# Patient Record
Sex: MALE | Race: WHITE | Employment: UNEMPLOYED | URBAN - METROPOLITAN AREA
[De-identification: names, ages, dates, MRNs, and addresses within clinical notes are randomized per-mention and may not be internally consistent; named-entity substitution may affect disease eponyms.]

---

## 2020-04-01 PROCEDURE — 99282 EMERGENCY DEPT VISIT SF MDM: CPT

## 2020-04-02 ENCOUNTER — HOSPITAL ENCOUNTER (EMERGENCY)
Facility: CLINIC | Age: 45
Discharge: HOME | End: 2020-04-02
Attending: EMERGENCY MEDICINE | Admitting: EMERGENCY MEDICINE

## 2020-04-02 VITALS
BODY MASS INDEX: 41.62 KG/M2 | RESPIRATION RATE: 20 BRPM | TEMPERATURE: 95.6 F | OXYGEN SATURATION: 100 % | WEIGHT: 315 LBS | DIASTOLIC BLOOD PRESSURE: 89 MMHG | SYSTOLIC BLOOD PRESSURE: 147 MMHG | HEART RATE: 111 BPM

## 2020-04-02 DIAGNOSIS — Z51.89 WOUND CHECK, ABSCESS: ICD-10-CM

## 2020-04-02 DIAGNOSIS — L02.31 LEFT BUTTOCK ABSCESS: ICD-10-CM

## 2020-04-02 RX ORDER — CEPHALEXIN 500 MG/1
500 CAPSULE ORAL 4 TIMES DAILY
Qty: 40 CAPSULE | Refills: 0 | Status: SHIPPED | OUTPATIENT
Start: 2020-04-02 | End: 2020-04-12

## 2020-04-02 RX ORDER — SULFAMETHOXAZOLE/TRIMETHOPRIM 800-160 MG
1 TABLET ORAL 2 TIMES DAILY
Qty: 20 TABLET | Refills: 0 | Status: SHIPPED | OUTPATIENT
Start: 2020-04-02 | End: 2020-04-12

## 2020-04-02 ASSESSMENT — ENCOUNTER SYMPTOMS
FEVER: 0
COLOR CHANGE: 0
WOUND: 1

## 2020-04-02 NOTE — ED PROVIDER NOTES
History     Chief Complaint:    Wound Check    The history is provided by the patient.      Tony Khanna is a 44 year old male with a history of type 2 diabetes who presents for a wound check. The patient reports having an abscess on his left butt cheek and he is currently on Keflex. He has had this wound for the last 5 days. The patient has been getting drainage from the wound, going through 2-3 pads today. The patient denies seeing a redness in the area and denies any fevers. The patient's sugars have been normal lately around 160.    Allergies:  Cats  Metformin     Medications:    Keflex  Metformin   Albuterol inhaler  Viagra  Lisinopril   Lipitor    Past Medical History:    Type 2 diabetes   Obesity  Tobacco use disorder  Cervicalgia   Erectile dysfunction  Sleep apnea  Candidiasis intertrigo  Lipoma  Allergic rhinitis  Asthma    Past Surgical History:    The patient does not have any pertinent past surgical history.    Family History:    Diabetes - father  Diabetes - mother  OCD - father  Anxiety - father  Narcolepsy - father  Blood clots - father    Social History:  Positive for tobacco use.  Positive for alcohol use.   Positive for drug use - marijuana.  Marital Status:  Single [1]     Review of Systems   Constitutional: Negative for fever.   Skin: Positive for wound (left butt cheek). Negative for color change.   All other systems reviewed and are negative.      Physical Exam     Patient Vitals for the past 24 hrs:   BP Temp Temp src Pulse Resp SpO2 Weight   04/02/20 0000 (!) 149/85 95.6  F (35.3  C) Temporal 111 20 97 % (!) 151 kg (333 lb)     Physical Exam  Musculoskeletal:  Normal movement of all extremities without evidence for deficit.    Skin: Focused exam of the left buttock shows an open abscess. The superficial skin plug of necrotic tissue, approximately 2.5 cm in diameter, has dislodged, resulting in an open wound, approximately 1.5 cm deep. The underlying tissue is pink and viable. Minimal  "purulent drainage. Minimal surrounding cellulitis. No tracking into the scrotum.    Neurologic:  Non-focal exam without asymmetric weakness or numbness.     Psychiatric:  Normal affect with appropriate interaction with examiner.    Emergency Department Course     Emergency Department Course:  Past medical records, nursing notes, and vitals reviewed.    0015: I performed an exam of the patient as documented above.     I personally reviewed the results with the Patient and answered all related questions prior to discharge.     Findings and plan explained to the Patient. Patient discharged home with instructions regarding supportive care, medications, and reasons to return. The importance of close follow-up was reviewed.     Impression & Plan     Medical Decision Making:  This unfortunate 44-year-old with type 2 diabetes, only on metformin, presents to us with a recurrent abscess to his left buttock.  He notes he has had 2 prior abscesses in this exact same area.  They typically will open up, drain, and improve with antibiotics.  He first noted this abscess approximately 5 days ago.  What brought him to the ER tonight is that he noted that it opened up and started to drain today and there was a large piece of \"tissue\" hanging out of the wound.  He otherwise feels as though his pain is improved since the pressure is been released from drainage and he denies having any associated fevers.    On exam, it appears as though the necrotic skin covering this has now come loose and opened.  It is still loosely attached to what appears to be well perfusing healthy tissue beneath.  There is a scant rim of cellulitis but no extension into the perineum.  I do not see that this tracks deeper or that there are other pockets yet to open up.    All the patient notes that this is uncomfortable, he feels that he is getting around well, able to ambulate without difficulty.  He has been doing sitz baths and I advised him to continue with " this.  He has almost finished his Keflex prescription.  I will extend this and add Bactrim for broader coverage considering his weight and diabetic history.  Most importantly, I counseled him that he should return to us immediately if he develops any increasing pain, fevers, extension into the perineum/scrotum, or if he has any other emergent concerns.      Diagnosis:    ICD-10-CM   1. Wound check, abscess  Z51.89   2. Left buttock abscess  L02.31     Disposition:  Discharged to home.    Discharge Medications:  New Prescriptions    CEPHALEXIN (KEFLEX) 500 MG CAPSULE    Take 1 capsule (500 mg) by mouth 4 times daily for 10 days    SULFAMETHOXAZOLE-TRIMETHOPRIM (BACTRIM DS) 800-160 MG TABLET    Take 1 tablet by mouth 2 times daily for 10 days     Scribe Disclosure:  JIM, Rupal Villavicencio, am serving as a scribe at 12:03 AM on 4/2/2020 to document services personally performed by Trierweiler, Chad A, MD based on my observations and the provider's statements to me.     Rupal Villavicencio  4/1/2020    EMERGENCY DEPARTMENT       Trierweiler, Chad A, MD  04/02/20 0109

## 2020-04-02 NOTE — DISCHARGE INSTRUCTIONS
Change to the prescribed antibiotics.  Continue with warm soaks along with dressing changes.  It is imperative you return to the ER for any fevers, increasing pain, signs the infection is spreading to your scrotum, or if you have ANY other emergent concerns.

## 2020-04-02 NOTE — ED AVS SNAPSHOT
Emergency Department  [address] [place] [id number]  Phone:  [phone]  Fax:  [phone]                                    Tony Khanna   MRN: [id number]    Department:   Emergency Department   Date of Visit:  4/1/2020           After Visit Summary Signature Page    I have received my discharge instructions, and my questions have been answered. I have discussed any challenges I see with this plan with the nurse or doctor.    ..........................................................................................................................................  Patient/Patient Representative Signature      ..........................................................................................................................................  Patient Representative Print Name and Relationship to Patient    ..................................................               ................................................  Date                                   Time    ..........................................................................................................................................  Reviewed by Signature/Title    ...................................................              ..............................................  Date                                               Time          22EPIC Rev 08/18